# Patient Record
Sex: MALE | Race: OTHER | NOT HISPANIC OR LATINO
[De-identification: names, ages, dates, MRNs, and addresses within clinical notes are randomized per-mention and may not be internally consistent; named-entity substitution may affect disease eponyms.]

---

## 2020-09-09 PROBLEM — Z00.00 ENCOUNTER FOR PREVENTIVE HEALTH EXAMINATION: Status: ACTIVE | Noted: 2020-09-09

## 2020-09-11 ENCOUNTER — APPOINTMENT (OUTPATIENT)
Dept: COLORECTAL SURGERY | Facility: CLINIC | Age: 35
End: 2020-09-11
Payer: COMMERCIAL

## 2020-09-11 VITALS
RESPIRATION RATE: 14 BRPM | TEMPERATURE: 98 F | HEART RATE: 74 BPM | SYSTOLIC BLOOD PRESSURE: 137 MMHG | DIASTOLIC BLOOD PRESSURE: 90 MMHG

## 2020-09-11 DIAGNOSIS — K60.3 ANAL FISTULA: ICD-10-CM

## 2020-09-11 DIAGNOSIS — F17.200 NICOTINE DEPENDENCE, UNSPECIFIED, UNCOMPLICATED: ICD-10-CM

## 2020-09-11 DIAGNOSIS — K61.0 ANAL ABSCESS: ICD-10-CM

## 2020-09-11 DIAGNOSIS — Z78.9 OTHER SPECIFIED HEALTH STATUS: ICD-10-CM

## 2020-09-11 PROCEDURE — 99203 OFFICE O/P NEW LOW 30 MIN: CPT | Mod: 25

## 2020-09-11 PROCEDURE — 46600 DIAGNOSTIC ANOSCOPY SPX: CPT

## 2020-09-11 NOTE — PROCEDURE
[FreeTextEntry1] : Anoscopy with suspicious internal opening site in the posterior midline location.  Small associated fissure.

## 2020-09-11 NOTE — PHYSICAL EXAM
[Posterior] : posteriorly [None] : there was no rectal mass  [Normal Rate and Rhythm] : normal rate and rhythm [Respiratory Effort] : normal respiratory effort [Calm] : calm [Gross Blood] : no gross blood [de-identified] : Soft, non tender, non distended. No mass or hernia appreciated [de-identified] : Small posterior perianal abscess with associated fistula.  No external drainage site noted.  Anoscopy was quite uncomfortable.  A posterior anal fissure and suspicious area of internal opening of the fistula noted.  Nonthrombosed external hemorrhoids anteriorly and posteriorly. [de-identified] : Well-appearing, in no distress [de-identified] : Normocephalic, atraumatic [de-identified] : Moves extremities without difficulty [de-identified] : Warm and dry [de-identified] : Alert and oriented x3

## 2020-09-11 NOTE — HISTORY OF PRESENT ILLNESS
[FreeTextEntry1] : 35-year-old male presents for consultation for hemorrhoids.  His symptoms began approximately 1 month ago when he developed a lump in the perianal region.  He has used topical hydrocortisone and suppositories to this area and it helped initially but his symptoms have not resolved.  Over the last few weeks, he has noticed fluctuating size of the lump in the perianal region especially increasing in size following bowel movements.  His pain is mostly also with bowel movements.  No associated drainage from the area.  No fevers or chills.

## 2020-09-22 ENCOUNTER — OUTPATIENT (OUTPATIENT)
Dept: OUTPATIENT SERVICES | Facility: HOSPITAL | Age: 35
LOS: 1 days | End: 2020-09-22
Payer: COMMERCIAL

## 2020-09-22 ENCOUNTER — APPOINTMENT (OUTPATIENT)
Dept: DISASTER EMERGENCY | Facility: CLINIC | Age: 35
End: 2020-09-22

## 2020-09-22 VITALS
WEIGHT: 106.92 LBS | HEART RATE: 88 BPM | RESPIRATION RATE: 16 BRPM | TEMPERATURE: 99 F | DIASTOLIC BLOOD PRESSURE: 87 MMHG | SYSTOLIC BLOOD PRESSURE: 133 MMHG | OXYGEN SATURATION: 100 %

## 2020-09-22 DIAGNOSIS — Z01.818 ENCOUNTER FOR OTHER PREPROCEDURAL EXAMINATION: ICD-10-CM

## 2020-09-22 DIAGNOSIS — K60.3 ANAL FISTULA: ICD-10-CM

## 2020-09-22 DIAGNOSIS — K81.0 ACUTE CHOLECYSTITIS: ICD-10-CM

## 2020-09-22 LAB
ANION GAP SERPL CALC-SCNC: 5 MMOL/L — SIGNIFICANT CHANGE UP (ref 5–17)
APTT BLD: 32.7 SEC — SIGNIFICANT CHANGE UP (ref 27.5–35.5)
BUN SERPL-MCNC: 9 MG/DL — SIGNIFICANT CHANGE UP (ref 7–23)
CALCIUM SERPL-MCNC: 9.8 MG/DL — SIGNIFICANT CHANGE UP (ref 8.5–10.1)
CHLORIDE SERPL-SCNC: 106 MMOL/L — SIGNIFICANT CHANGE UP (ref 96–108)
CO2 SERPL-SCNC: 27 MMOL/L — SIGNIFICANT CHANGE UP (ref 22–31)
CREAT SERPL-MCNC: 0.56 MG/DL — SIGNIFICANT CHANGE UP (ref 0.5–1.3)
GLUCOSE SERPL-MCNC: 85 MG/DL — SIGNIFICANT CHANGE UP (ref 70–99)
HCT VFR BLD CALC: 40.8 % — SIGNIFICANT CHANGE UP (ref 39–50)
HGB BLD-MCNC: 14.6 G/DL — SIGNIFICANT CHANGE UP (ref 13–17)
INR BLD: 0.94 RATIO — SIGNIFICANT CHANGE UP (ref 0.88–1.16)
MCHC RBC-ENTMCNC: 32.8 PG — SIGNIFICANT CHANGE UP (ref 27–34)
MCHC RBC-ENTMCNC: 35.8 GM/DL — SIGNIFICANT CHANGE UP (ref 32–36)
MCV RBC AUTO: 91.7 FL — SIGNIFICANT CHANGE UP (ref 80–100)
NRBC # BLD: 0 /100 WBCS — SIGNIFICANT CHANGE UP (ref 0–0)
PLATELET # BLD AUTO: 308 K/UL — SIGNIFICANT CHANGE UP (ref 150–400)
POTASSIUM SERPL-MCNC: 4.3 MMOL/L — SIGNIFICANT CHANGE UP (ref 3.5–5.3)
POTASSIUM SERPL-SCNC: 4.3 MMOL/L — SIGNIFICANT CHANGE UP (ref 3.5–5.3)
PROTHROM AB SERPL-ACNC: 11 SEC — SIGNIFICANT CHANGE UP (ref 10.6–13.6)
RBC # BLD: 4.45 M/UL — SIGNIFICANT CHANGE UP (ref 4.2–5.8)
RBC # FLD: 11.9 % — SIGNIFICANT CHANGE UP (ref 10.3–14.5)
SODIUM SERPL-SCNC: 138 MMOL/L — SIGNIFICANT CHANGE UP (ref 135–145)
WBC # BLD: 9.18 K/UL — SIGNIFICANT CHANGE UP (ref 3.8–10.5)
WBC # FLD AUTO: 9.18 K/UL — SIGNIFICANT CHANGE UP (ref 3.8–10.5)

## 2020-09-22 PROCEDURE — 85027 COMPLETE CBC AUTOMATED: CPT

## 2020-09-22 PROCEDURE — G0463: CPT

## 2020-09-22 PROCEDURE — 80076 HEPATIC FUNCTION PANEL: CPT

## 2020-09-22 PROCEDURE — 93010 ELECTROCARDIOGRAM REPORT: CPT

## 2020-09-22 PROCEDURE — 36415 COLL VENOUS BLD VENIPUNCTURE: CPT

## 2020-09-22 PROCEDURE — 85610 PROTHROMBIN TIME: CPT

## 2020-09-22 PROCEDURE — 93005 ELECTROCARDIOGRAM TRACING: CPT

## 2020-09-22 PROCEDURE — 80048 BASIC METABOLIC PNL TOTAL CA: CPT

## 2020-09-22 PROCEDURE — 85730 THROMBOPLASTIN TIME PARTIAL: CPT

## 2020-09-22 NOTE — H&P PST ADULT - NSANTHOSAYNRD_GEN_A_CORE
No. SARANYA screening performed.  STOP BANG Legend: 0-2 = LOW Risk; 3-4 = INTERMEDIATE Risk; 5-8 = HIGH Risk

## 2020-09-22 NOTE — H&P PST ADULT - NSICDXPROBLEM_GEN_ALL_CORE_FT
PROBLEM DIAGNOSES  Problem: Preoperative testing  Assessment and Plan: No medical clearance needed as per surgeon. CBC, Comprehensive panel, PT/PTT and EKG ordered as per surgeon. Spoke with Dr. Darling regarding abnormal EKG and medical clearance is not required. Pre-op instructions and surgical scrubs given and pt verbalized understanding. Pt has COVWhois PCR testing appt on 9/23 @ 7am at the [facility] site.     Problem: Anal fistula  Assessment and Plan: Exam under anesthesia, perianal abscess drainage, fistulotomy, pudenal block and any other related procedures on 9/25/2020.          PROBLEM DIAGNOSES  Problem: Preoperative testing  Assessment and Plan: No medical clearance needed as per surgeon. CBC, Comprehensive panel, PT/PTT and EKG ordered as per surgeon. Spoke with Dr. Darling regarding abnormal EKG and medical clearance is not required. Pre-op instructions given and pt verbalized understanding. Pt has COVID19 PCR testing appt on 9/23 @ 7am at the [facility] site.     Problem: Anal fistula  Assessment and Plan: Exam under anesthesia, perianal abscess drainage, fistulotomy, pudenal block and any other related procedures on 9/25/2020.

## 2020-09-22 NOTE — H&P PST ADULT - HISTORY OF PRESENT ILLNESS
34 y/o male with no PMH here for PST. P      Pt electing for exam under anesthesia, perianal abscess drainage, fistulotomy, pudenal block and any other related procedures on 9/25/2020. 36 y/o male with no PMH here for PST.     Pt complaining of having a "lump" to perianal region for the last 2 months. Pt was diagnosed with anal fistula. Pt denies n/v/d and constipation. Pt takes Aleve PRN with moderate pain relief.   Pt electing for exam under anesthesia, perianal abscess drainage, fistulotomy, pudenal block and any other related procedures on 9/25/2020. 36 y/o male with no PMH here for PST. Pt complaining of having a "lump" to perianal region for the last 2 months. Pt was diagnosed with anal fistula. Pt denies n/v/d and constipation. Pt takes Aleve PRN with moderate pain relief. Pt electing for exam under anesthesia, perianal abscess drainage, fistulotomy, pudenal block and any other related procedures on 9/25/2020.

## 2020-09-22 NOTE — H&P PST ADULT - GENERAL COMMENTS
Pt denies history of travel outside the country, outside the [facility], denies having had the COVID19 infection and denies COVID19 positive contacts within the last 21 days.

## 2020-09-22 NOTE — H&P PST ADULT - RS GEN PE MLT RESP DETAILS PC
clear to auscultation bilaterally/good air movement/airway patent/breath sounds equal/respirations non-labored/normal

## 2020-09-23 ENCOUNTER — APPOINTMENT (OUTPATIENT)
Dept: DISASTER EMERGENCY | Facility: CLINIC | Age: 35
End: 2020-09-23

## 2020-09-24 ENCOUNTER — TRANSCRIPTION ENCOUNTER (OUTPATIENT)
Age: 35
End: 2020-09-24

## 2020-09-24 LAB — SARS-COV-2 N GENE NPH QL NAA+PROBE: NOT DETECTED

## 2020-09-24 RX ORDER — SODIUM CHLORIDE 9 MG/ML
1000 INJECTION, SOLUTION INTRAVENOUS
Refills: 0 | Status: DISCONTINUED | OUTPATIENT
Start: 2020-09-25 | End: 2020-10-09

## 2020-09-25 ENCOUNTER — OUTPATIENT (OUTPATIENT)
Dept: OUTPATIENT SERVICES | Facility: HOSPITAL | Age: 35
LOS: 1 days | End: 2020-09-25
Payer: COMMERCIAL

## 2020-09-25 ENCOUNTER — RESULT REVIEW (OUTPATIENT)
Age: 35
End: 2020-09-25

## 2020-09-25 ENCOUNTER — APPOINTMENT (OUTPATIENT)
Dept: COLORECTAL SURGERY | Facility: HOSPITAL | Age: 35
End: 2020-09-25

## 2020-09-25 VITALS
HEART RATE: 78 BPM | SYSTOLIC BLOOD PRESSURE: 118 MMHG | TEMPERATURE: 98 F | OXYGEN SATURATION: 100 % | RESPIRATION RATE: 12 BRPM | DIASTOLIC BLOOD PRESSURE: 76 MMHG

## 2020-09-25 VITALS
OXYGEN SATURATION: 100 % | DIASTOLIC BLOOD PRESSURE: 87 MMHG | WEIGHT: 106.92 LBS | SYSTOLIC BLOOD PRESSURE: 133 MMHG | HEART RATE: 88 BPM | RESPIRATION RATE: 16 BRPM | TEMPERATURE: 98 F

## 2020-09-25 DIAGNOSIS — K60.3 ANAL FISTULA: ICD-10-CM

## 2020-09-25 DIAGNOSIS — K81.0 ACUTE CHOLECYSTITIS: ICD-10-CM

## 2020-09-25 PROCEDURE — 88304 TISSUE EXAM BY PATHOLOGIST: CPT

## 2020-09-25 PROCEDURE — 46258 REMOVE IN/EX HEM GRP W/FISTU: CPT

## 2020-09-25 PROCEDURE — 88304 TISSUE EXAM BY PATHOLOGIST: CPT | Mod: 26

## 2020-09-25 RX ORDER — POLYETHYLENE GLYCOL 3350 17 G/17G
17 POWDER, FOR SOLUTION ORAL
Qty: 300 | Refills: 0
Start: 2020-09-25

## 2020-09-25 RX ORDER — BUPIVACAINE 13.3 MG/ML
20 INJECTION, SUSPENSION, LIPOSOMAL INFILTRATION ONCE
Refills: 0 | Status: COMPLETED | OUTPATIENT
Start: 2020-09-25 | End: 2020-09-25

## 2020-09-25 RX ORDER — TRAMADOL HYDROCHLORIDE 50 MG/1
1 TABLET ORAL
Qty: 16 | Refills: 0
Start: 2020-09-25

## 2020-09-25 RX ORDER — ONDANSETRON 8 MG/1
4 TABLET, FILM COATED ORAL ONCE
Refills: 0 | Status: DISCONTINUED | OUTPATIENT
Start: 2020-09-25 | End: 2020-09-25

## 2020-09-25 RX ORDER — OXYCODONE HYDROCHLORIDE 5 MG/1
5 TABLET ORAL ONCE
Refills: 0 | Status: DISCONTINUED | OUTPATIENT
Start: 2020-09-25 | End: 2020-09-25

## 2020-09-25 RX ORDER — CEFOTETAN DISODIUM 1 G
2 VIAL (EA) INJECTION ONCE
Refills: 0 | Status: COMPLETED | OUTPATIENT
Start: 2020-09-25 | End: 2020-09-25

## 2020-09-25 RX ORDER — SODIUM CHLORIDE 9 MG/ML
1000 INJECTION, SOLUTION INTRAVENOUS
Refills: 0 | Status: DISCONTINUED | OUTPATIENT
Start: 2020-09-25 | End: 2020-09-25

## 2020-09-25 RX ORDER — HYDROMORPHONE HYDROCHLORIDE 2 MG/ML
0.5 INJECTION INTRAMUSCULAR; INTRAVENOUS; SUBCUTANEOUS
Refills: 0 | Status: DISCONTINUED | OUTPATIENT
Start: 2020-09-25 | End: 2020-09-25

## 2020-09-25 RX ADMIN — BUPIVACAINE 20 MILLILITER(S): 13.3 INJECTION, SUSPENSION, LIPOSOMAL INFILTRATION at 15:50

## 2020-09-25 RX ADMIN — SODIUM CHLORIDE 75 MILLILITER(S): 9 INJECTION, SOLUTION INTRAVENOUS at 15:49

## 2020-09-25 RX ADMIN — Medication 100 GRAM(S): at 13:25

## 2020-09-25 RX ADMIN — SODIUM CHLORIDE 60 MILLILITER(S): 9 INJECTION, SOLUTION INTRAVENOUS at 11:40

## 2020-09-25 NOTE — BRIEF OPERATIVE NOTE - NSICDXBRIEFPROCEDURE_GEN_ALL_CORE_FT
PROCEDURES:  Pudendal block 25-Sep-2020 14:13:53  Polina Guerra  Rectal examination under anesthesia with fissurectomy 25-Sep-2020 14:13:42  Polina Guerra  Drainage of perianal abscess 25-Sep-2020 14:13:07  Polina Guerra  Exam under anesthesia, rectum, with fistulotomy, hemorrhoidectomy, or anal sphincterotomy, or any combination if indicated 25-Sep-2020 14:12:56  Polina Guerra

## 2020-09-25 NOTE — BRIEF OPERATIVE NOTE - NSICDXBRIEFPREOP_GEN_ALL_CORE_FT
PRE-OP DIAGNOSIS:  Anal fissure 25-Sep-2020 14:14:23  Polina Guerra  Anal fistula 25-Sep-2020 14:14:15  Polina Guerra  Perianal abscess 25-Sep-2020 14:14:05  Polina Guerra

## 2020-09-25 NOTE — ASU DISCHARGE PLAN (ADULT/PEDIATRIC) - CARE PROVIDER_API CALL
Polina Guerra  COLON/RECTAL SURGERY  [address] [place] [address]  Phone: [phone]  Fax: [phone]  Follow Up Time: 1 month

## 2020-09-25 NOTE — ASU DISCHARGE PLAN (ADULT/PEDIATRIC) - ASU DC SPECIAL INSTRUCTIONSFT
- Remove surgical dressing for next bowel movement.  - Expect some bleeding from surgical site. This should improve over the next few days.   - There may also be some drainage from area. Use dry gauze or a pad in the area and change as needed  - Perform sitz baths at least twice daily, after every bowel movement and as needed for comfort. May also apply ice to the area for pain control  - Use tylenol (500 mg q 6 hours) or ibuprofen (600 mg every 6 hours) for pain control  - Only use narcotic pain medication for severe pain.   - Use daily fiber supplement such as konsyl or metamucil as well as stool softeners (colace, Miralax) daily to avoid constipation  - Make follow up appointment for 3-4 weeks from surgery

## 2020-09-25 NOTE — BRIEF OPERATIVE NOTE - NSICDXBRIEFPOSTOP_GEN_ALL_CORE_FT
POST-OP DIAGNOSIS:  Perianal abscess 25-Sep-2020 14:14:48  Polina Guerra  Anal fissure and fistula 25-Sep-2020 14:14:38  Polina Guerra

## 2024-07-09 NOTE — ASU PATIENT PROFILE, ADULT - PAIN SCALE PREFERRED, PROFILE
LOV 5/8/24   RTO 7/29/24  LRF 5/8/24          Controlled Substance Monitoring:    Acute and Chronic Pain Monitoring:        No data to display                     numerical 0-10